# Patient Record
(demographics unavailable — no encounter records)

---

## 2025-01-07 NOTE — PHYSICAL EXAM
[NL (45)] : extension 45 degrees [NL (80)] : left lateral rotation 80 degrees [Rotation to right] : rotation to right [Straightening consistent with spasm] : Straightening consistent with spasm [Facet arthropathy] : Facet arthropathy [Disc space narrowing] : Disc space narrowing [[id number]] : right lateral rotation 60 degrees [NL (0-180)] : full active abduction 0-180 degrees [NL (0-70)] : full internal rotation 0-70 degrees [NL (0-90)] : full external rotation 0-90 degrees [Sitting] : sitting [Mild] : mild [5___] : internal rotation 5[unfilled]/5 [] : no sensory deficits [Bilateral] : shoulder bilaterally [There are no fractures, subluxations or dislocations. No significant abnormalities are seen] : There are no fractures, subluxations or dislocations. No significant abnormalities are seen

## 2025-01-07 NOTE — HISTORY OF PRESENT ILLNESS
[Neck] : neck [10] : 10 [4] : 4 [Dull/Aching] : dull/aching [Sharp] : sharp [Constant] : constant [Household chores] : household chores [Leisure] : leisure [Sleep] : sleep [Rest] : rest [Full time] : Work status: full time [] : Post Surgical Visit: no [FreeTextEntry1] : bilateral shoulders [de-identified] : Raising arm

## 2025-01-07 NOTE — REASON FOR VISIT
[FreeTextEntry2] : Patient is a 66 year old male with complaints of bilateral shoulder pain for several weeks. Left is worse than the right. Patient has some radiating neck pain. Patient wakes up with N & T down both arms.

## 2025-01-07 NOTE — ASSESSMENT
[FreeTextEntry1] : discussed daignosis and treatment options. Will try PT, prescribed diclofenac.  The patient was prescribed an anti- inflammatory medication at today's visit. The patient was advised that this medication should be taken with food as they can cause gastrointestinal upset. They patient is also aware that these medications may exacerbate any pre existing hypertension and they should speak with their medical doctor before starting the medication. They were advised to stop the medication if they experience any stomach upset or develop headaches or blurry vision. MRI cervical spine is ordered.

## 2025-01-28 NOTE — PHYSICAL EXAM
[NL (45)] : extension 45 degrees [NL (80)] : left lateral rotation 80 degrees [Rotation to right] : rotation to right [Left] : left shoulder [NL (0-180)] : full active abduction 0-180 degrees [NL (0-70)] : full internal rotation 0-70 degrees [NL (0-90)] : full external rotation 0-90 degrees [Sitting] : sitting [Mild] : mild [5___] : internal rotation 5[unfilled]/5 [TWNoteComboBox6] : right lateral rotation 60 degrees [] : no sensory deficits

## 2025-01-28 NOTE — PROCEDURE
[Large Joint Injection] : Large joint injection [Left] : of the left [Subacromial Space] : subacromial space [Pain] : pain [Betadine] : betadine [Ethyl Chloride sprayed topically] : ethyl chloride sprayed topically [Sterile technique used] : sterile technique used [___ cc    1%] : Lidocaine ~Vcc of 1%  [___ cc    40mg] : Methylprednisolone (Depomedrol) ~Vcc of 40 mg  [] : Patient tolerated procedure well [Apply ice for 15min out of every hour for the next 12-24 hours as tolerated] : apply ice for 15 minutes out of every hour for the next 12-24 hours as tolerated [Patient was advised to rest the joint(s) for ____ days] : patient was advised to rest the joint(s) for [unfilled] days [Risks, benefits, alternatives discussed / Verbal consent obtained] : the risks benefits, and alternatives have been discussed, and verbal consent was obtained

## 2025-01-28 NOTE — HISTORY OF PRESENT ILLNESS
[Neck] : neck [10] : 10 [4] : 4 [Dull/Aching] : dull/aching [Sharp] : sharp [Constant] : constant [Household chores] : household chores [Leisure] : leisure [Sleep] : sleep [Rest] : rest [Full time] : Work status: full time [] : Post Surgical Visit: no [FreeTextEntry1] : bilateral shoulders [de-identified] : Raising arm  [de-identified] : 1/7/2025 [de-identified] : Dr. Newman [de-identified] : MRI

## 2025-01-28 NOTE — ASSESSMENT
[FreeTextEntry1] : MRI results discussed; will continue with PT and NSAIDs. Offered injection left shoulder today; possible risks including infection discussed. Wants to proceed. Instructed on ice left shoulder today.

## 2025-01-28 NOTE — REASON FOR VISIT
[FreeTextEntry2] : MRI review C Spine: C5C6 left disc herniation. Started PT and takes diclofenac. Notes pain left arm at night.